# Patient Record
Sex: FEMALE
[De-identification: names, ages, dates, MRNs, and addresses within clinical notes are randomized per-mention and may not be internally consistent; named-entity substitution may affect disease eponyms.]

---

## 2022-04-13 ENCOUNTER — RESULT REVIEW (OUTPATIENT)
Age: 42
End: 2022-04-13

## 2022-05-13 ENCOUNTER — RESULT REVIEW (OUTPATIENT)
Age: 42
End: 2022-05-13

## 2022-11-14 ENCOUNTER — APPOINTMENT (OUTPATIENT)
Dept: RADIOLOGY | Facility: CLINIC | Age: 42
End: 2022-11-14

## 2022-11-14 ENCOUNTER — OUTPATIENT (OUTPATIENT)
Dept: OUTPATIENT SERVICES | Facility: HOSPITAL | Age: 42
LOS: 1 days | End: 2022-11-14

## 2022-11-14 PROCEDURE — 77080 DXA BONE DENSITY AXIAL: CPT | Mod: 26

## 2024-04-17 ENCOUNTER — APPOINTMENT (OUTPATIENT)
Dept: HUMAN REPRODUCTION | Facility: CLINIC | Age: 44
End: 2024-04-17
Payer: COMMERCIAL

## 2024-04-17 PROCEDURE — 99204 OFFICE O/P NEW MOD 45 MIN: CPT | Mod: 25

## 2024-04-17 PROCEDURE — 36415 COLL VENOUS BLD VENIPUNCTURE: CPT

## 2024-04-17 PROCEDURE — 76830 TRANSVAGINAL US NON-OB: CPT

## 2024-04-18 ENCOUNTER — TRANSCRIPTION ENCOUNTER (OUTPATIENT)
Age: 44
End: 2024-04-18

## 2024-04-18 PROBLEM — Z00.00 ENCOUNTER FOR PREVENTIVE HEALTH EXAMINATION: Status: ACTIVE | Noted: 2024-04-18

## 2024-04-19 ENCOUNTER — APPOINTMENT (OUTPATIENT)
Dept: HUMAN REPRODUCTION | Facility: CLINIC | Age: 44
End: 2024-04-19
Payer: COMMERCIAL

## 2024-04-19 PROCEDURE — 58340 CATHETER FOR HYSTEROGRAPHY: CPT

## 2024-04-19 PROCEDURE — 58999I: CUSTOM

## 2024-05-02 ENCOUNTER — APPOINTMENT (OUTPATIENT)
Dept: HUMAN REPRODUCTION | Facility: CLINIC | Age: 44
End: 2024-05-02
Payer: COMMERCIAL

## 2024-05-02 PROCEDURE — 99215 OFFICE O/P EST HI 40 MIN: CPT | Mod: 95

## 2024-05-09 ENCOUNTER — APPOINTMENT (OUTPATIENT)
Dept: HEMATOLOGY ONCOLOGY | Facility: CLINIC | Age: 44
End: 2024-05-09

## 2024-05-09 NOTE — DISCUSSION/SUMMARY
[FreeTextEntry1] : The visit was provided via telehealth using real-time 2-way audio visual technology. The patient, Le Anand, was located at home in  at the time of the visit. The provider, Jyothi Hayes, was located at the medical office in [place], [place] at the time of the visit. Verbal consent for telehealth services was given on 24 by the patient, Le Anand.  REASON FOR CONSULT: Le Anand is a 43-year-old female referred by Dr. Danae Carty for cancer genetic counseling and risk assessment due to a family history of cancer. Ms. Anand was seen on May 9, 2024 via telehealth at which time medical and family history was ascertained and a pedigree constructed. She was accompanied by her partner, Tobin.   RELEVANT MEDICAL HISTORY: Ms. Anand is a healthy individual with no reported history of cancer. She has a family history of cancer, see below. Of note, patient recently found out she was pregnant and is about 4-5 weeks in gestation.   OTHER MEDICAL AND SURGICAL HISTORY: -	Medical History: no significant history reported -	Surgical History: microdiscectomy, spinal fusion  OB/GYN HISTORY: Obstetrical History: , currently pregnant  Age at Menarche: 15 Menopausal Status: Premenopausal  Age at First Live Birth: N/A Oral Contraceptive Use: Yes, 10 years  Hormone Replacement Therapy: No  CANCER SCREENING HISTORY:   Breast:  -	Mammography: 2023- reportedly wnl -	Sonography: 2023- reportedly wnl -	MRI: No -	Biopsies: No GYN: -	Pelvic Examination: reportedly wnl, history of uterine fibroid  Colon: -	Colonoscopy: 10 years ago d/t abdominal pain- possibly 1 or 2 little polyps, repeat at 46 (general population) Skin:   -	FBSE: Annual -	Lesions biopsied/removed: Yes, face- reportedly benign   SOCIAL HISTORY: -	Tobacco-product use: No -	Environmental exposures: No   FAMILY HISTORY: Maternal ancestry was reported as [place]/English and paternal ancestry was reported as [place]/Kittitian/Macanese/Congolese. Ashkenazi Shinto ancestry was denied. A detailed family history of cancer was ascertained, see below and scanned chart for pedigree.   According to Ms. Anand no one in the family has had germline testing for cancer susceptibility. Consanguinity was denied.  	 RISK ASSESSMENT: Ms. Anand's personal and family history is suggestive of a hereditary cancer syndrome given her mother's history of stomach cancer, maternal aunt with breast cancer at age 47, and paternal grandmother with breast cancer in her late 40s with a possible second primary. The patient meets National Comprehensive Cancer Network (NCCN) criteria for genetic testing. The risks, benefits and limitations of genetic testing were discussed with Ms. Anand. In addition, we discussed the purpose of genetic testing and possible test results (positive, negative, inconclusive) along with associated medical management options and psychosocial implications. Insurance coverage and potential out of pocket costs were also discussed. The Genetic Information Non-discrimination Act ([place]) was reviewed.   It was explained that risk assessment is based upon medical and family history as provided and may change in the future should new information be obtained.   Following our discussion, Ms. Anand she would like to defer genetic testing until after delivery as it may cause excess anxiety knowing results with possibly limited options for screening during pregnancy. In the meantime, we recommended she contact her relatives to determine additional details on the cancer history in the family and if anyone underwent germline testing. We remain available to coordinate this testing in the future.   Additionally, we discussed even if genetic testing in the future returns negative, she would still be at higher risk to develop breast cancer given her family history. An CYNTHIA risk evaluation, v8 was conducted utilizing family history and reproductive factors.  An updated 22.13% remaining lifetime risk of breast cancer was quoted. Given the elevated CYNTHIA score, patient's age, and family history of breast cancer, we discussed meeting with a breast specialist now to review breast management options during pregnancy and timing of potential screening in greater depth. Patient was amenable to this plan and referral was made.   PLAN: 1.	Patient deferred genetic testing today and stated she would like to proceed after delivery (currently pregnant). 2.	Referral to high-risk breast clinic was made in the meantime to discuss screening options given personal and family history of breast cancer.   For any additional questions please call Cancer Genetics at [phone].    Jyothi Hayes MS, [facility] [place] Genetic Counselor, Cancer Genetics

## 2024-05-16 ENCOUNTER — APPOINTMENT (OUTPATIENT)
Dept: HUMAN REPRODUCTION | Facility: CLINIC | Age: 44
End: 2024-05-16

## 2024-05-24 ENCOUNTER — APPOINTMENT (OUTPATIENT)
Dept: BREAST CENTER | Facility: CLINIC | Age: 44
End: 2024-05-24